# Patient Record
Sex: FEMALE | Race: WHITE
[De-identification: names, ages, dates, MRNs, and addresses within clinical notes are randomized per-mention and may not be internally consistent; named-entity substitution may affect disease eponyms.]

---

## 2017-05-30 ENCOUNTER — HOSPITAL ENCOUNTER (OUTPATIENT)
Dept: HOSPITAL 11 - JP.SDS | Age: 62
Discharge: HOME | End: 2017-05-30
Attending: SURGERY
Payer: MEDICARE

## 2017-05-30 VITALS — SYSTOLIC BLOOD PRESSURE: 123 MMHG | DIASTOLIC BLOOD PRESSURE: 65 MMHG

## 2017-05-30 DIAGNOSIS — I12.9: ICD-10-CM

## 2017-05-30 DIAGNOSIS — E11.22: ICD-10-CM

## 2017-05-30 DIAGNOSIS — Z12.11: Primary | ICD-10-CM

## 2017-05-30 DIAGNOSIS — D12.0: ICD-10-CM

## 2017-05-30 DIAGNOSIS — Z80.0: ICD-10-CM

## 2017-05-30 DIAGNOSIS — D12.3: ICD-10-CM

## 2017-05-30 DIAGNOSIS — N18.9: ICD-10-CM

## 2017-05-30 DIAGNOSIS — D12.2: ICD-10-CM

## 2017-05-30 PROCEDURE — 45380 COLONOSCOPY AND BIOPSY: CPT

## 2017-05-30 PROCEDURE — 82962 GLUCOSE BLOOD TEST: CPT

## 2017-05-30 NOTE — OR
CORRECTED COPY

 

FINDINGS:

1. Cecal polyp, 5 mm, completely removed using cold biopsy forceps.

2. Ascending colon polyp, approximately 5 mm, completely removed using cold biopsy

    forceps.

3. Transverse colon polyp, approximately 5 mm, completely removed using cold biopsy

    forceps.

4. Transverse colon polyp, #2, approximately 5 mm, completely removed using cold biopsy

    forceps.

 

 

 

DATE OF PROCEDURE:  05/30/2017

 

PROCEDURE PERFORMED:  Colonoscopy.

 

FINDINGS:

1. Cecal polyp, approximately 5 mm, completely removed using cold biopsy forceps.

2. Transverse colon polyps;.

    a.     #1, approximately 5 mm, completely removed using cold biopsy forceps.

    b.     #2, 5 mm, completely removed using cold biopsy forceps.

 

COMPLICATIONS:  None.

 

ASSISTANT:  None.

 

PREOPERATIVE DIAGNOSIS:  Family history of colorectal cancer.

 

POSTOPERATIVE DIAGNOSIS:  Family history of colorectal cancer.

 

RISKS:  Risks, benefits, alternatives, and limitations including infection, bleeding, and

perforation were explained to the patient , and they wished to proceed.

 

PROCEDURE IN DETAIL:  The patient was placed in left lateral decubitus position.  Digital

rectal exam was performed without abnormality.  The scope was introduced and advanced

atraumatically to the ileocecal valve.

 

The scope was brought back to the ascending, transverse, descending colon, and retroflexed.

 

No diverticulosis.  No masses.  The aforementioned polyps were all identified and completely

removed without abnormality.  The patient tolerated the procedure well.

 

 

 

 

Jame Higginbotham MD

DD:  05/30/2017 09:15:43

DT:  05/30/2017 09:40:12

Job #:  [id number]

## 2017-07-24 ENCOUNTER — HOSPITAL ENCOUNTER (EMERGENCY)
Dept: HOSPITAL 11 - JP.ED | Age: 62
Discharge: HOME | End: 2017-07-24
Payer: MEDICARE

## 2017-07-24 VITALS — DIASTOLIC BLOOD PRESSURE: 83 MMHG | SYSTOLIC BLOOD PRESSURE: 176 MMHG

## 2017-07-24 DIAGNOSIS — M17.0: ICD-10-CM

## 2017-07-24 DIAGNOSIS — E11.22: ICD-10-CM

## 2017-07-24 DIAGNOSIS — Z88.8: ICD-10-CM

## 2017-07-24 DIAGNOSIS — Z88.1: ICD-10-CM

## 2017-07-24 DIAGNOSIS — Z87.01: ICD-10-CM

## 2017-07-24 DIAGNOSIS — M25.562: ICD-10-CM

## 2017-07-24 DIAGNOSIS — Z79.899: ICD-10-CM

## 2017-07-24 DIAGNOSIS — Z86.2: ICD-10-CM

## 2017-07-24 DIAGNOSIS — Z88.0: ICD-10-CM

## 2017-07-24 DIAGNOSIS — I48.91: ICD-10-CM

## 2017-07-24 DIAGNOSIS — M06.9: ICD-10-CM
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
## 2017-07-24 DIAGNOSIS — Z91.041: ICD-10-CM

## 2017-07-24 DIAGNOSIS — I12.9: ICD-10-CM

## 2017-07-24 DIAGNOSIS — N18.3: ICD-10-CM

## 2017-07-24 DIAGNOSIS — Z79.4: ICD-10-CM

## 2017-07-24 DIAGNOSIS — Z91.018: ICD-10-CM

## 2017-07-24 DIAGNOSIS — S61.412A: Primary | ICD-10-CM

## 2017-07-24 DIAGNOSIS — Z98.49: ICD-10-CM

## 2017-07-24 DIAGNOSIS — Z88.2: ICD-10-CM

## 2017-07-24 DIAGNOSIS — E78.00: ICD-10-CM

## 2017-07-24 DIAGNOSIS — M25.561: ICD-10-CM

## 2017-07-24 NOTE — EDM.PDOC
ED HPI GENERAL MEDICAL PROBLEM





- General


Chief Complaint: Laceration


Stated Complaint: CUT THUMB


Time Seen by Provider: 07/24/17 20:29


Source of Information: Reports: Patient


History Limitations: Reports: No Limitations





- History of Present Illness


INITIAL COMMENTS - FREE TEXT/NARRATIVE: 





61-year-old female cut her left hand with a knife while trying to cut a steak. 

She has a curved flap laceration on the outside of the base of the thumb. No 

distal paresthesias, weakness or limited range of motion.


Onset: Today


Duration: Hour(s): (Within the last hour)


Location: Reports: Upper Extremity, Left


Associated Symptoms: Reports: No Other Symptoms





- Related Data


 Allergies











Allergy/AdvReac Type Severity Reaction Status Date / Time


 


cefprozil Allergy  Hives Verified 07/24/17 20:22


 


cefuroxime [From Ceftin] Allergy  Hives Verified 07/24/17 20:22


 


celecoxib Allergy  Hives Verified 07/24/17 20:22


 


cephalexin Allergy  Hives Verified 07/24/17 20:22


 


ciprofloxacin Allergy  Swelling Verified 07/24/17 20:22


 


clindamycin Allergy  Hives Verified 07/24/17 20:22


 


erythromycin base Allergy  Hives Verified 07/24/17 20:22


 


etanercept [From Enbrel] Allergy  Hives Verified 07/24/17 20:22


 


Iodinated Contrast- Oral and Allergy  Hives Verified 07/24/17 20:22





IV Dye     





[Iodinated Contrast Media -     





Oral and]     


 


metformin Allergy  Hives Verified 07/24/17 20:22


 


naproxen Allergy  Hives Verified 07/24/17 20:22


 


Penicillins Allergy  Hives Verified 07/24/17 20:22


 


pineapple Allergy  Other Verified 07/24/17 20:22


 


propoxyphene Allergy  Hives Verified 07/24/17 20:22


 


rofecoxib [From Vioxx] Allergy  Hives Verified 07/24/17 20:22


 


Sulfa (Sulfonamide Allergy  Hives Verified 07/24/17 20:22





Antibiotics)     











Home Meds: 


 Home Meds





Acetaminophen [Tylenol Extra Strength] 1,000 mg PO Q6H PRN 05/15/17 [History]


Acyclovir 400 mg PO TID 05/15/17 [History]


Aspirin 325 mg PO DAILY 05/15/17 [History]


Atenolol [Tenormin] 25 mg PO BEDTIME 05/15/17 [History]


Atenolol [Tenormin] 50 mg PO QAM 05/15/17 [History]


Cholecalciferol (Vitamin D3) [Vitamin D3] 3,000 units PO DAILY 05/15/17 [History

]


Folic Acid 1 mg PO DAILY 05/15/17 [History]


Furosemide [Lasix] 40 mg PO 1400 05/15/17 [History]


Furosemide [Lasix] 80 mg PO QAM 05/15/17 [History]


Hydrocodone/Acetaminophen [Hydrocodon-Acetaminophen 5-325] 1 - 2 tab PO Q6H PRN 

05/15/17 [History]


Insulin Glarg,Human.Rec.Analog [LantUS Solostar] 20 unit SQ QAM 05/15/17 [

History]


Insulin Glarg,Human.Rec.Analog [LantUS Solostar] 20 unit SUBCUT QPM 05/15/17 [

History]


Nitroglycerin [Nitrostat] 0.4 mg SL ASDIRECTED PRN 05/15/17 [History]


Omeprazole 20 mg PO BEDTIME 05/15/17 [History]


Primidone 100 mg PO BEDTIME 05/15/17 [History]


Simvastatin [Zocor] 40 mg PO BEDTIME 05/15/17 [History]


Spironolactone [Aldactone] 100 mg PO DAILY 05/15/17 [History]


azaTHIOprine [Imuran] 50 mg PO TID 05/15/17 [History]


diphenhydrAMINE [Benadryl] 25 mg PO BEDTIME PRN 05/15/17 [History]


predniSONE [Prednisone] 5 mg PO DAILY 05/15/17 [History]











Past Medical History


HEENT History: Reports: Cataract, Retinal Detachment, Other (See Below)


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


Cardiovascular History: Reports: Afib, Angina, Heart Murmur, High Cholesterol, 

Hypertension, Other (See Below)


Other Cardiovascular History: small vessel vasculitis


Respiratory History: Reports: Bronchitis, Recurrent, Pneumonia, Recurrent, 

Other (See Below)


Other Respiratory History: questionable PE in past, right lung histoplasmosis


Gastrointestinal History: Reports: Hemorrhoids


Genitourinary History: Reports: Other (See Below)


Other Genitourinary History: Stage III renal failure


OB/GYN History: Reports: Pregnancy, Other (See Below)


Other OB/BYN History: ovarian cyst


Musculoskeletal History: Reports: RA, Other (See Below)


Other Musculoskeletal History: osteopenia.  bilateral knee pain


Endocrine/Metabolic History: Reports: Diabetes, Type II, Multinodular Thyroid


Hematologic History: Reports: Anemia


Immunologic History: Reports: Other (See Below)


Other Immunologic History: Lupus


Dermatologic History: Reports: Other (See Below)


Other Dermatologic History: leg phlebitis, cellulitis





- Past Surgical History


HEENT Surgical History: Reports: Cataract Surgery, Detached Retina


GI Surgical History: Reports: Colonoscopy


Female  Surgical History: Reports: Breast Biopsy, Hysterectomy, Salpingo-

Oophorectomy, Tubal Ligation


Neurological Surgical History: Reports: Spinal Fusion, Other (See Below)


Other Neurological Surgeries/Procedures: L5-S1 fusion


Musculoskeletal Surgical History: Reports: Arthroscopic Procedure, Other (See 

Below)


Other Musculoskeletal Surgeries/Procedures:: "Huey" feet - reconstruction of 

bilat feet





Social & Family History





- Tobacco Use


Smoking Status *Q: Never Smoker


Second Hand Smoke Exposure: Yes





- Caffeine Use


Caffeine Use: Reports: Soda





- Recreational Drug Use


Recreational Drug Use: No





ED ROS GENERAL





- Review of Systems


Review Of Systems: See Below


Constitutional: Denies: Fever


Respiratory: Denies: Shortness of Breath


GI/Abdominal: Denies: Nausea, Vomiting


Skin: Reports: Other (Patient does have lupus, "weak skin".)





ED EXAM, SKIN/RASH


Exam: See Below


Exam Limited By: No Limitations


General Appearance: Alert, No Apparent Distress


Respiratory/Chest: No Respiratory Distress


Extremities: Other (Exam is otherwise limited to the left hand. The patient is 

a 2.5 cm curved flap laceration on the back of the left thumb over the 

metacarpal area. Distal CMS is normal.)





Course





- Vital Signs


Last Recorded V/S: 


 Last Vital Signs











Temp  98.0 F   07/24/17 20:28


 


Pulse  66   07/24/17 20:28


 


Resp  16   07/24/17 20:28


 


BP  176/83 H  07/24/17 20:28


 


Pulse Ox  97   07/24/17 20:28














- Orders/Labs/Meds


Meds: 


Medications














Discontinued Medications














Generic Name Dose Route Start Last Admin





  Trade Name Freq  PRN Reason Stop Dose Admin


 


Bacitracin  1 dose  07/24/17 20:25  07/24/17 21:06





  Bacitracin Oint 1 Gm  TOP  07/24/17 20:26  1 dose





  ONETIME ONE   Administration


 


Lidocaine HCl  5 ml  07/24/17 20:25  07/24/17 21:06





  Xylocaine-Mpf 1%  INJECT  07/24/17 20:26  5 ml





  ONETIME ONE   Administration














- Re-Assessments/Exams


Free Text/Narrative Re-Assessment/Exam: 





07/24/17 21:03


The wound was anesthetized with 1% lidocaine, cleansed thoroughly with saline 

and Hibiclens and 5 5-0 Ethilon sutures were used to close the wound. Topical 

bacitracin was applied and the sutures can be removed in 8 days.





Departure





- Departure


Time of Disposition: 21:35


Disposition: Home, Self-Care 01


Condition: Good


Clinical Impression: 


Laceration of hand


Qualifiers:


 Encounter type: initial encounter Foreign body presence: without foreign body 

Laterality: left Qualified Code(s): S61.412A - Laceration without foreign body 

of left hand, initial encounter








- Discharge Information


Instructions:  Laceration Care, Adult, Easy-to-Read


Referrals: 


Baldemar Bee MD [Primary Care Provider] - 


Forms:  ED Department Discharge


Care Plan Goals: 


Keep wound covered and clean while healing. Sutures can be removed in 8 days, 

recheck sooner if concerns of infection or not healing satisfactorily.

## 2018-07-17 ENCOUNTER — HOSPITAL ENCOUNTER (OUTPATIENT)
Dept: HOSPITAL 11 - JP.SDS | Age: 63
Discharge: HOME | End: 2018-07-17
Attending: SURGERY
Payer: MEDICARE

## 2018-07-17 VITALS — DIASTOLIC BLOOD PRESSURE: 66 MMHG | SYSTOLIC BLOOD PRESSURE: 142 MMHG

## 2018-07-17 DIAGNOSIS — Z12.11: Primary | ICD-10-CM

## 2018-07-17 DIAGNOSIS — N18.9: ICD-10-CM

## 2018-07-17 DIAGNOSIS — Z80.0: ICD-10-CM

## 2018-07-17 DIAGNOSIS — I12.9: ICD-10-CM

## 2018-07-17 DIAGNOSIS — Z86.010: ICD-10-CM

## 2018-07-17 DIAGNOSIS — D12.4: ICD-10-CM

## 2018-07-17 DIAGNOSIS — E11.22: ICD-10-CM

## 2018-07-17 DIAGNOSIS — K64.4: ICD-10-CM

## 2018-07-17 PROCEDURE — 45380 COLONOSCOPY AND BIOPSY: CPT

## 2018-07-17 NOTE — OR
DATE OF PROCEDURE:  07/17/2018

 

PROCEDURE:  Colonoscopy.

 

FINDINGS:  Descending colon polyp, approximately 5 mm, completely removed using cold biopsy

forceps.

 

COMPLICATIONS:  None.

 

ASSISTANT:  None.

 

ANESTHETIC:  MAC.

 

PREOPERATIVE DIAGNOSIS:  History of colon polyps/family history of colorectal cancer.

 

POSTOPERATIVE DIAGNOSIS:  History of colon polyps/family history of colorectal cancer.

 

RISKS:  Risks, benefits, alternatives, and limitations including, but not limited to

infection, bleeding, and perforation were explained to the patient, who wished to proceed.

 

PROCEDURE DETAILS:  The patient was placed in left lateral decubitus position.  Digital

rectal exam was performed, which showed mild external hemorrhoids.  The scope was introduced

and advanced atraumatically to the cecum.  A photo was taken of this.  The scope was brought

back to the remaining colon.  The aforementioned polyp was identified and completely

removed.  No other abnormalities.  No old or new blood.  The patient tolerated the procedure

well.

 

No diverticulosis or other polyps.

 

 

 

 

Jame Higginbotham MD

DD:  07/17/2018 08:38:27

DT:  07/17/2018 08:51:07

Job #:  [id number]

## 2021-05-20 NOTE — EDM.PDOC
ED HPI GENERAL MEDICAL PROBLEM





- General


Chief Complaint: General


Stated Complaint: BRUISING ON RT SIDE AFTER PROCEDURE


Time Seen by Provider: 05/20/21 18:42


Source of Information: Reports: Patient


History Limitations: Reports: No Limitations





- History of Present Illness


INITIAL COMMENTS - FREE TEXT/NARRATIVE: 


Rani is a 65-year-old female presenting to the ED for evaluation of a large 

hematoma in her right forearm, arm, and extending into the axilla and right 

chest/breast area.  The patient underwent a cardiac angiogram at [facility] days ago where they did a left radial artery approach.  They had to 

downsize the size of the catheter because of vasospasm resulting in a large 

extravasation of blood around the artery extending up the arm.  She had a 

significant drop in her hemoglobin from 11.3-9.0 resulting in them keeping her 

overnight for observation.  She states that they gave her some type of sedative 

for the angiogram and when she woke up her throat felt quite scratchy like she 

had been intubated.  Since then she has been having difficulty with 

concentration and feels ill.  No recollection of the angiogram due to the 

sedation.  She is worried because the arm and right breast are becoming quite 

tender and painful.  She reports that during her stay in the hospital they were 

having difficulty keeping her sugars under control because they were underdosing

her with insulin resulting in her leukosis levels being over 200.  At one point 

they were over 350 and the only gave her 5 units of regular insulin for this.  

He also has a history of stage III kidney failure and was supposed to have a 

creatinine drawn in the clinic today but did not make it because she was not 

feeling well.  She also reports that she was hypokalemic at the time of dischar

ge with a potassium of 3.2 and they gave her a couple tablets of K-Linda but did 

not check her again after that.  She is on Plavix following the angioplasty.  

She has history significant for a loud systolic ejection murmur heard in the 

left upper sternal border, bilateral carotid stenosis with the right carotid 

being 100% occluded and left carotid being 69% occluded.  She denies any new 

numbness, weakness, or incoordination.





  ** Right Arm


Pain Score (Numeric/FACES): 6





- Related Data


                                    Allergies











Allergy/AdvReac Type Severity Reaction Status Date / Time


 


cefprozil Allergy  Hives Verified 05/20/21 18:44


 


cefuroxime [From Ceftin] Allergy  Hives Verified 05/20/21 18:44


 


celecoxib Allergy  Hives Verified 05/20/21 18:44


 


cephalexin Allergy  Hives Verified 05/20/21 18:44


 


ciprofloxacin Allergy  Swelling Verified 05/20/21 18:44


 


clindamycin Allergy  Hives Verified 05/20/21 18:44


 


erythromycin base Allergy  Hives Verified 05/20/21 18:44


 


etanercept [From Enbrel] Allergy  Hives Verified 05/20/21 18:44


 


Iodinated Contrast Media Allergy  Hives Verified 05/20/21 18:44





[Iodinated Contrast Media -     





Oral and]     


 


metformin Allergy  Hives Verified 05/20/21 18:44


 


naproxen Allergy  Hives Verified 05/20/21 18:44


 


Penicillins Allergy  Hives Verified 05/20/21 18:44


 


pineapple Allergy  Other Verified 05/20/21 18:44


 


propoxyphene Allergy  Hives Verified 05/20/21 18:44


 


rofecoxib [From Vioxx] Allergy  Hives Verified 05/20/21 18:44


 


Sulfa (Sulfonamide Allergy  Hives Verified 05/20/21 18:44





Antibiotics)     











Home Meds: 


                                    Home Meds





Acetaminophen [Tylenol Extra Strength] 1,000 mg PO Q6H PRN 05/15/17 [History]


Aspirin 81 mg PO DAILY 05/15/17 [History]


Cholecalciferol (Vitamin D3) [Vitamin D3] 1,000 units PO DAILY 05/15/17 

[History]


Folic Acid 1 mg PO DAILY 05/15/17 [History]


Furosemide [Lasix] 80 mg PO DAILY 05/15/17 [History]


Hydrocodone/Acetaminophen [Hydrocodon-Acetaminophen 5-325] 1 - 2 tab PO Q6H PRN 

05/15/17 [History]


Insulin Glarg,Human.Rec.Analog [LantUS Solostar] 25 unit SQ QAM 05/15/17 

[History]


Omeprazole 20 mg PO BEDTIME 05/15/17 [History]


Primidone 100 mg PO BEDTIME 05/15/17 [History]


Spironolactone [Aldactone] 100 mg PO DAILY 05/15/17 [History]


azaTHIOprine [Imuran] 50 mg PO DAILY 05/15/17 [History]


predniSONE [Prednisone] 5 mg PO DAILY 05/15/17 [History]


Propranolol [Inderal] 20 mg PO BID 07/16/18 [History]


Acyclovir [Zovirax] 200 mg PO TID 03/10/20 [History]


Doxycycline [Vibramycin] 1 cap PO BID 03/10/20 [History]


Ibandronate [Boniva] 1 tab PO ASDIRECTED 03/10/20 [History]


Insulin Aspart [NovoLOG] 0 units SQ ASDIRECTED 03/10/20 [History]


Clopidogrel [Plavix] 75 mg PO DAILY 05/20/21 [History]


Furosemide 40 mg PO BEDTIME 05/20/21 [History]


Rosuvastatin [Crestor] 40 mg PO BEDTIME 05/20/21 [History]


amLODIPine [Norvasc] 2.5 mg PO DAILY 05/20/21 [History]











Past Medical History


HEENT History: Reports: Cataract, Retinal Detachment, Other (See Below)


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


Cardiovascular History: Reports: Afib, Angina, Heart Murmur, High Cholesterol, 

Hypertension, Other (See Below)


Other Cardiovascular History: small vessel vasculitis.  angiogram with stent 

april and may 2021.


Respiratory History: Reports: Bronchitis, Recurrent, Pneumonia, Recurrent, Other

 (See Below)


Other Respiratory History: questionable PE in past, right lung histoplasmosis


Gastrointestinal History: Reports: Colon Polyp, Hemorrhoids


Genitourinary History: Reports: Renal Calculus, Other (See Below)


Other Genitourinary History: Stage III renal failure


OB/GYN History: Reports: Dysfunctional Uterine Bleeding, Pregnancy, Other (See 

Below)


Other OB/GYN History: ovarian cyst


Musculoskeletal History: Reports: Back Pain, Chronic, Fracture, RA, SLE, Other 

(See Below)


Other Musculoskeletal History: osteopenia.  bilateral knee pain


Neurological History: Reports: None


Endocrine/Metabolic History: Reports: Diabetes, Type II, Multinodular Thyroid


Hematologic History: Reports: Anemia, Blood Transfusion(s)


Immunologic History: Reports: Other (See Below)


Other Immunologic History: Lupus


Dermatologic History: Reports: Other (See Below)


Other Dermatologic History: leg phlebitis, cellulitis





- Infectious Disease History


Infectious Disease History: Reports: Chicken Pox, Measles, Pertussis (Whooping 

Cough), Rheumatic Fever, Scarlet Fever, Shingles





- Past Surgical History


HEENT Surgical History: Reports: Cataract Surgery, Detached Retina


Cardiovascular Surgical History: Reports: Coronary Artery Stent


GI Surgical History: Reports: Colonoscopy, EGD


Female  Surgical History: Reports: Breast Biopsy, Hysterectomy, Salpingo-

Oophorectomy, Tubal Ligation


Endocrine Surgical History: Reports: Thyroid Biopsy


Neurological Surgical History: Reports: Spinal Fusion, Other (See Below)


Other Neurological Surgeries/Procedures: L5-S1 fusion


Musculoskeletal Surgical History: Reports: Arthroscopic Procedure, Other (See 

Below)


Other Musculoskeletal Surgeries/Procedures:: Scharco" feet - reconstruction of 

bilat feet





Social & Family History





- Tobacco Use


Tobacco Use Status *Q: Never Tobacco User





- Caffeine Use


Caffeine Use: Reports: None





- Recreational Drug Use


Recreational Drug Use: No





ED ROS GENERAL





- Review of Systems


Review Of Systems: See Below


Constitutional: Reports: No Symptoms


HEENT: Reports: Throat Pain (Patient states this feels like they had intubated 

her with her throat feeling scratchy)


Respiratory: Reports: No Symptoms


Cardiovascular: Reports: No Symptoms


Endocrine: Reports: No Symptoms


GI/Abdominal: Reports: No Symptoms


: Reports: No Symptoms


Musculoskeletal: Reports: Arm Pain (Right arm pain secondary to hematoma 

formation in the right forearm, arm, axilla, and chest wall)


Skin: Reports: Bruising (Extensive bruising through the right forearm, arm, and 

axilla)


Neurological: Reports: Other (Patient is complaining of feeling "foggy" since 

undergoing her procedure)


Psychiatric: Reports: No Symptoms


Hematologic/Lymphatic: Reports: No Symptoms


Immunologic: Reports: No Symptoms





ED EXAM, GENERAL





- Physical Exam


Exam: See Below


Exam Limited By: No Limitations


General Appearance: Alert, No Apparent Distress, Anxious


Eye Exam: Bilateral Eye: EOMI, PERRL


Throat/Mouth: Normal Inspection


Head: Atraumatic, Normocephalic


Neck: Normal Inspection, Supple, Carotid Bruit (Left-sided)


Respiratory/Chest: No Respiratory Distress, Lungs Clear, Normal Breath Sounds


Cardiovascular: Normal Peripheral Pulses, Regular Rate, Rhythm, No Edema, No 

JVD, Systolic Murmur (Grade 4/6 systolic ejection murmur heard in the left upper

 sternal region.)


Peripheral Pulses: 2+: Radial (L), Radial (R), Posterior Tibial (L), Posterior 

Tibial (R)


GI/Abdominal: Normal Bowel Sounds, Soft, Non-Tender


Back Exam: Normal Inspection


Extremities: Normal Range of Motion, No Pedal Edema, Normal Capillary Refill, 

Other (Very large hematoma extending up to the right dorsal forearm and arm into

 the axilla and lateral chest wall the area is very tender to palpation.)


Neurological: Alert, Oriented, Normal Cognition, No Motor/Sensory Deficits


Psychiatric: Normal Affect


Skin Exam: Warm, Dry, Ecchymosis (Large area of ecchymosis tracking down the 

dorsal forearm, arm, into the axilla and chest wall), Increased Warmth 

(Following the path of the ecchymosis)


Lymphatic: No Adenopathy





Course





- Vital Signs


Last Recorded V/S: 


                                Last Vital Signs











Temp  36.4 C   05/20/21 18:43


 


Pulse  72   05/20/21 20:56


 


Resp  16   05/20/21 18:43


 


BP  127/63   05/20/21 20:56


 


Pulse Ox  99   05/20/21 20:56














- Orders/Labs/Meds


Labs: 


                                Laboratory Tests











  05/20/21 05/20/21 05/20/21 Range/Units





  19:10 19:10 19:10 


 


WBC  7.8    (4.5-11.0)  K/uL


 


RBC  2.95 L    (3.30-5.50)  M/uL


 


Hgb  9.4 L D    (12.0-15.0)  g/dL


 


Hct  29.3 L    (36.0-48.0)  %


 


MCV  99 H    (80-98)  fL


 


MCH  32 H    (27-31)  pg


 


MCHC  32    (32-36)  %


 


Plt Count  198    (150-400)  K/uL


 


Neut % (Auto)  70.0 H    (36-66)  %


 


Lymph % (Auto)  19.1 L    (24-44)  %


 


Mono % (Auto)  9.4 H    (2-6)  %


 


Eos % (Auto)  1.4 L    (2-4)  %


 


Baso % (Auto)  0.1    (0-1)  %


 


PT   10.4   (9.5-12.0)  sec


 


INR   0.95   (0.80-1.20)  


 


APTT   21.3 L   (27.0-36.0)  sec


 


Sodium    140  (140-148)  mmol/L


 


Potassium    3.9  (3.6-5.2)  mmol/L


 


Chloride    97 L  (100-108)  mmol/L


 


Carbon Dioxide    33 H  (21-32)  mmol/L


 


Anion Gap    13.9  (5.0-14.0)  mmol/L


 


BUN    30 H  (7-18)  mg/dL


 


Creatinine    1.4 H  (0.6-1.0)  mg/dL


 


Est Cr Clr Drug Dosing    39.33  mL/min


 


Estimated GFR (MDRD)    38 L  (>60)  


 


Glucose    136 H  ()  mg/dL


 


Calcium    9.1  (8.5-10.1)  mg/dL


 


Total Bilirubin    0.4  (0.2-1.0)  mg/dL


 


AST    19  (15-37)  U/L


 


ALT    23  (12-78)  U/L


 


Alkaline Phosphatase    74  ()  U/L


 


Total Protein    6.8  (6.4-8.2)  g/dL


 


Albumin    3.7  (3.4-5.0)  g/dL


 


Globulin    3.1  (2.3-3.5)  g/dL


 


Albumin/Globulin Ratio    1.2  (1.2-2.2)  


 


Urine Color     (YELLOW)  


 


Urine Appearance     (CLEAR)  


 


Urine pH     (5.0-8.0)  


 


Ur Specific Gravity     (1.008-1.030)  


 


Urine Protein     (NEGATIVE)  mg/dL


 


Urine Glucose (UA)     (NEGATIVE)  mg/dL


 


Urine Ketones     (NEGATIVE)  mg/dL


 


Urine Occult Blood     (NEGATIVE)  


 


Urine Nitrite     (NEGATIVE)  


 


Urine Bilirubin     (NEGATIVE)  


 


Urine Urobilinogen     (0.2-1.0)  EU/dL


 


Ur Leukocyte Esterase     (NEGATIVE)  


 


Urine RBC     (0-5)  


 


Urine WBC     (0-5)  


 


Ur Epithelial Cells     


 


Amorphous Sediment     


 


Urine Bacteria     


 


Urine Mucus     














  05/20/21 Range/Units





  19:46 


 


WBC   (4.5-11.0)  K/uL


 


RBC   (3.30-5.50)  M/uL


 


Hgb   (12.0-15.0)  g/dL


 


Hct   (36.0-48.0)  %


 


MCV   (80-98)  fL


 


MCH   (27-31)  pg


 


MCHC   (32-36)  %


 


Plt Count   (150-400)  K/uL


 


Neut % (Auto)   (36-66)  %


 


Lymph % (Auto)   (24-44)  %


 


Mono % (Auto)   (2-6)  %


 


Eos % (Auto)   (2-4)  %


 


Baso % (Auto)   (0-1)  %


 


PT   (9.5-12.0)  sec


 


INR   (0.80-1.20)  


 


APTT   (27.0-36.0)  sec


 


Sodium   (140-148)  mmol/L


 


Potassium   (3.6-5.2)  mmol/L


 


Chloride   (100-108)  mmol/L


 


Carbon Dioxide   (21-32)  mmol/L


 


Anion Gap   (5.0-14.0)  mmol/L


 


BUN   (7-18)  mg/dL


 


Creatinine   (0.6-1.0)  mg/dL


 


Est Cr Clr Drug Dosing   mL/min


 


Estimated GFR (MDRD)   (>60)  


 


Glucose   ()  mg/dL


 


Calcium   (8.5-10.1)  mg/dL


 


Total Bilirubin   (0.2-1.0)  mg/dL


 


AST   (15-37)  U/L


 


ALT   (12-78)  U/L


 


Alkaline Phosphatase   ()  U/L


 


Total Protein   (6.4-8.2)  g/dL


 


Albumin   (3.4-5.0)  g/dL


 


Globulin   (2.3-3.5)  g/dL


 


Albumin/Globulin Ratio   (1.2-2.2)  


 


Urine Color  Yellow  (YELLOW)  


 


Urine Appearance  Clear  (CLEAR)  


 


Urine pH  7.0  (5.0-8.0)  


 


Ur Specific Gravity  1.015  (1.008-1.030)  


 


Urine Protein  30 H  (NEGATIVE)  mg/dL


 


Urine Glucose (UA)  Negative  (NEGATIVE)  mg/dL


 


Urine Ketones  Negative  (NEGATIVE)  mg/dL


 


Urine Occult Blood  Trace-intact H  (NEGATIVE)  


 


Urine Nitrite  Negative  (NEGATIVE)  


 


Urine Bilirubin  Negative  (NEGATIVE)  


 


Urine Urobilinogen  1.0  (0.2-1.0)  EU/dL


 


Ur Leukocyte Esterase  Trace H  (NEGATIVE)  


 


Urine RBC  0-5  (0-5)  


 


Urine WBC  5-10 H  (0-5)  


 


Ur Epithelial Cells  Few  


 


Amorphous Sediment  Not seen  


 


Urine Bacteria  Moderate  


 


Urine Mucus  Few  














- Re-Assessments/Exams


Free Text/Narrative Re-Assessment/Exam: 





05/20/21 21:38 I reviewed the patient's labs showing her hemoglobin is at 9.4 

which is consistent with where it was when she was discharged from the hospital 

following the angiogram.  Her kidney function is doing well with a creatinine of

 1.4.  Her GFR is calculated at 45 which puts her at a chronic kidney disease 

stage IIIa.  Her potassium is normal at 3.9.  At this time, the brain fog is 

likely due to recovering from her hypnotic that was used during her sedation for

 her angiogram.  This may take up to a week to clear.  I encouraged her to 

continue to push fluids to help flush the kidneys from the contrast use during 

the angiogram and angioplasty.  At this time she is stable and suitable for 

discharge home in satisfactory condition.  She may continue to take Tylenol for 

pain control and I encouraged her to use heating pads or heating blanket on the 

area of the hematoma to help with absorption.  All questions were answered prior

 to discharge and indications return to the ED were discussed.








Departure





- Departure


Time of Disposition: 21:35


Disposition: Home, Self-Care 01


Clinical Impression: 


 Brain fog





Postoperative hematoma involving circulatory system


Qualifiers:


 Procedure type: cardiac catheterization Qualified Code(s): I97.630 - 

Postprocedural hematoma of a circulatory system organ or structure following a 

cardiac catheterization





Chronic renal failure, stage 3 (moderate)


Qualifiers:


 Chronic kidney disease stage 3 subtype: stage 3a (GFR 45-59) Qualified Code(s):

 N18.31 - Chronic kidney disease, stage 3a








- Discharge Information


Instructions:  Chronic Kidney Disease, Adult, Easy-to-Read


Referrals: 


Baldemar Bee MD [Primary Care Provider] - 


Forms:  ED Department Discharge


Care Plan Goals: 


The hematoma in your forearm, arm, and chest wall are likely stable at this 

point.  Will take several weeks for this to reabsorb.  You may use heating pad 

or hot packs to the area to help reduce pain and swelling.  Please take Tylenol 

also for the pain.  You will want to avoid ibuprofen as you are already on P

lavix.  Continue to push fluids to prevent dehydration and keep the kidneys 

functioning properly after the contrast from the angiogram.





Sepsis Event Note (ED)





- Evaluation


Sepsis Screening Result: No Definite Risk





- Focused Exam


Vital Signs: 


                                   Vital Signs











  Temp Pulse Resp BP Pulse Ox


 


 05/20/21 20:56   72   127/63  99


 


 05/20/21 18:43  36.4 C  81  16  147/64 H  97


 


 05/20/21 18:20  36.4 C  81  16  147/64 H  97














- Problem List & Annotations


(1) Brain fog


SNOMED Code(s): 401663351


   Code(s): F48.8 - OTHER SPECIFIED NONPSYCHOTIC MENTAL DISORDERS   Status: 

Acute   Priority: Medium   Current Visit: Yes   





(2) Chronic renal failure, stage 3 (moderate)


SNOMED Code(s): 97410850, 383749848


   Code(s): N18.30 - CHRONIC KIDNEY DISEASE, STAGE 3 UNSPECIFIED   Status: 

Chronic   Priority: Medium   Current Visit: Yes   


Qualifiers: 


   Chronic kidney disease stage 3 subtype: stage 3a (GFR 45-59)   Qualified 

Code(s): N18.31 - Chronic kidney disease, stage 3a   





(3) Postoperative hematoma involving circulatory system


SNOMED Code(s): 192273326, 704912916


   Code(s): SIP9533 -    Status: Acute   Priority: High   Current Visit: Yes   


Qualifiers: 


   Procedure type: cardiac catheterization   Qualified Code(s): I97.630 - 

Postprocedural hematoma of a circulatory system organ or structure following a 

cardiac catheterization   





- Problem List Review


Problem List Initiated/Reviewed/Updated: Yes

## 2021-10-25 NOTE — EDM.PDOC
ED HPI GENERAL MEDICAL PROBLEM





- General


Chief Complaint: Chest Pain


Stated Complaint: BURNING IN CHEST


Time Seen by Provider: 10/25/21 12:35


Source of Information: Reports: Patient


History Limitations: Reports: No Limitations





- History of Present Illness


INITIAL COMMENTS - FREE TEXT/NARRATIVE: 





66-year-old female who has known coronary artery disease, had stents placed 

earlier this year and went in for a routine post evaluation at the clinic for 

cardiology follow-up and informed the provider that she was having recurring 

burning in her chest.  It sounds like it was fairly stable initially but she 

starting to have symptoms at rest.  An EKG apparently was done at the clinic 

which showed some changes that were stable from previous EKGs.  The patient says

she occasionally takes nitroglycerin but it gives her headache so she typically 

just rests until the pressure or burning goes away.  I got a call from a clinic 

nurse that said she was coming over to be evaluated for "unstable angina".  I 

did not get a call from the provider.  She has no nausea or vomiting.  The 

symptoms have been ongoing for several weeks but seem to be worsening.


Onset: Gradual


Duration: Week(s): (Several weeks)


Location: Reports: Chest (Chest burning)


Associated Symptoms: Reports: No Other Symptoms


  ** Chest


Pain Score (Numeric/FACES): 4





- Related Data


                                    Allergies











Allergy/AdvReac Type Severity Reaction Status Date / Time


 


cefprozil Allergy  Hives Verified 10/25/21 12:50


 


cefuroxime [From Ceftin] Allergy  Hives Verified 10/25/21 12:50


 


celecoxib Allergy  Hives Verified 10/25/21 12:50


 


cephalexin Allergy  Hives Verified 10/25/21 12:50


 


ciprofloxacin Allergy  Swelling Verified 10/25/21 12:50


 


clindamycin Allergy  Hives Verified 10/25/21 12:50


 


erythromycin base Allergy  Hives Verified 10/25/21 12:50


 


etanercept [From Enbrel] Allergy  Hives Verified 10/25/21 12:50


 


Iodinated Contrast Media Allergy  Hives Verified 10/25/21 12:50





[Iodinated Contrast Media -     





Oral and]     


 


metformin Allergy  Hives Verified 10/25/21 12:50


 


naproxen Allergy  Hives Verified 10/25/21 12:50


 


Penicillins Allergy  Hives Verified 10/25/21 12:50


 


pineapple Allergy  Other Verified 10/25/21 12:50


 


propoxyphene Allergy  Hives Verified 10/25/21 12:50


 


rofecoxib [From Vioxx] Allergy  Hives Verified 10/25/21 12:50


 


Sulfa (Sulfonamide Allergy  Hives Verified 10/25/21 12:50





Antibiotics)     











Home Meds: 


                                    Home Meds





Acetaminophen [Tylenol Extra Strength] 1,000 mg PO Q6H PRN 05/15/17 [History]


Aspirin 81 mg PO DAILY 05/15/17 [History]


Cholecalciferol (Vitamin D3) [Vitamin D3] 1,000 units PO DAILY 05/15/17 

[History]


Folic Acid 1 mg PO DAILY 05/15/17 [History]


Furosemide [Lasix] 80 mg PO DAILY 05/15/17 [History]


Hydrocodone/Acetaminophen [Hydrocodon-Acetaminophen 5-325] 1 - 2 tab PO Q6H PRN 

05/15/17 [History]


Insulin Glarg,Human.Rec.Analog [LantUS Solostar] 25 unit SQ QAM 05/15/17 

[History]


Omeprazole 20 mg PO BEDTIME 05/15/17 [History]


Primidone 100 mg PO BEDTIME 05/15/17 [History]


Spironolactone [Aldactone] 100 mg PO DAILY 05/15/17 [History]


azaTHIOprine [Imuran] 50 mg PO DAILY 05/15/17 [History]


predniSONE [Prednisone] 5 mg PO DAILY 05/15/17 [History]


Propranolol [Inderal] 20 mg PO BID 07/16/18 [History]


Acyclovir [Zovirax] 200 mg PO TID 03/10/20 [History]


Doxycycline [Vibramycin] 1 cap PO BID 03/10/20 [History]


Ibandronate [Boniva] 1 tab PO ASDIRECTED 03/10/20 [History]


Insulin Aspart [NovoLOG] 0 units SQ ASDIRECTED 03/10/20 [History]


Clopidogrel [Plavix] 75 mg PO DAILY 05/20/21 [History]


Furosemide 40 mg PO BEDTIME 05/20/21 [History]


Rosuvastatin [Crestor] 40 mg PO BEDTIME 05/20/21 [History]


amLODIPine [Norvasc] 2.5 mg PO DAILY 05/20/21 [History]











Past Medical History


HEENT History: Reports: Cataract, Retinal Detachment, Other (See Below)


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


Cardiovascular History: Reports: Afib, Angina, Heart Murmur, High Cholesterol, 

Hypertension, Other (See Below)


Other Cardiovascular History: small vessel vasculitis.  angiogram with stent 

april and may 2021.


Respiratory History: Reports: Bronchitis, Recurrent, Pneumonia, Recurrent, Other

 (See Below)


Other Respiratory History: questionable PE in past, right lung histoplasmosis


Gastrointestinal History: Reports: Colon Polyp, Hemorrhoids


Genitourinary History: Reports: Renal Calculus, Other (See Below)


Other Genitourinary History: Stage III renal failure


OB/GYN History: Reports: Dysfunctional Uterine Bleeding, Pregnancy, Other (See 

Below)


Other OB/GYN History: ovarian cyst


Musculoskeletal History: Reports: Back Pain, Chronic, Fracture, RA, SLE, Other 

(See Below)


Other Musculoskeletal History: osteopenia.  bilateral knee pain


Neurological History: Reports: None


Endocrine/Metabolic History: Reports: Diabetes, Type II, Multinodular Thyroid


Hematologic History: Reports: Anemia, Blood Transfusion(s)


Immunologic History: Reports: Other (See Below)


Other Immunologic History: Lupus


Dermatologic History: Reports: Other (See Below)


Other Dermatologic History: leg phlebitis, cellulitis





- Infectious Disease History


Infectious Disease History: Reports: Chicken Pox, Measles, Pertussis (Whooping 

Cough), Rheumatic Fever, Scarlet Fever, Shingles





- Past Surgical History


HEENT Surgical History: Reports: Cataract Surgery, Detached Retina


Cardiovascular Surgical History: Reports: Coronary Artery Stent


GI Surgical History: Reports: Colonoscopy, EGD


Female  Surgical History: Reports: Breast Biopsy, Hysterectomy, Salpingo-

Oophorectomy, Tubal Ligation


Endocrine Surgical History: Reports: Thyroid Biopsy


Neurological Surgical History: Reports: Spinal Fusion, Other (See Below)


Other Neurological Surgeries/Procedures: L5-S1 fusion


Musculoskeletal Surgical History: Reports: Arthroscopic Procedure, Other (See 

Below)


Other Musculoskeletal Surgeries/Procedures:: Scharco" feet - reconstruction of 

bilat feet





Social & Family History





- Caffeine Use


Caffeine Use: Reports: None





ED ROS GENERAL





- Review of Systems


Review Of Systems: See Below


Constitutional: Denies: Fever, Chills


Respiratory: Denies: Shortness of Breath


Cardiovascular: Reports: Chest Pain (Burning sensation)


GI/Abdominal: Denies: Abdominal Pain, Nausea, Vomiting


Skin: Denies: Diaphoresis


Neurological: Reports: Other (Patient struggles with some type of chronic 

peripheral neuropathy)


Psychiatric: Reports: No Symptoms





ED EXAM, GENERAL





- Physical Exam


Exam: See Below


Exam Limited By: No Limitations


General Appearance: Alert, No Apparent Distress


Head: Atraumatic


Respiratory/Chest: No Respiratory Distress, Lungs Clear, Other (I cannot 

reproduce chest wall tenderness to palpation)


Cardiovascular: Regular Rate, Rhythm


GI/Abdominal: Soft, Non-Tender


Extremities: No: Pedal Edema


Neurological: Alert, Oriented


Psychiatric: Normal Affect, Normal Mood


Skin Exam: Warm, Dry





Course





- Vital Signs


Last Recorded V/S: 


                                Last Vital Signs











Temp  97.3 F   10/25/21 12:49


 


Pulse  70   10/25/21 12:49


 


Resp  16   10/25/21 12:49


 


BP  159/56 H  10/25/21 12:49


 


Pulse Ox  98   10/25/21 12:49














- Orders/Labs/Meds


Labs: 


                                Laboratory Tests











  10/25/21 Range/Units





  12:48 


 


Troponin I  < 0.017  (0.000-0.056)  ng/mL














- Re-Assessments/Exams


Free Text/Narrative Re-Assessment/Exam: 





10/25/21 12:48


I reviewed the clinic note from the cardiologist nurse practitioner.  Apparently

 she was sent over for troponin, if it was positive more urgent evaluation was 

needed, if negative an outpatient angiogram will be set up.  A troponin was 

ordered and I will pass along the result to the nurse practitioner when it is 

available.


10/25/21 13:37


Troponin is negative and the patient remained stable while in the emergency 

room.  She will be discharged to follow-up with cardiology to arrange her 

evaluation at another time.  She can return anytime if worsening or her pain is 

more persistent.





Departure





- Departure


Time of Disposition: 13:45


Disposition: Home, Self-Care 01


Clinical Impression: 


 Angina at rest








- Discharge Information


Instructions:  Angina, Easy-to-Read


Referrals: 


Baldemar Bee MD [Primary Care Provider] - 


Forms:  ED Department Discharge


Care Plan Goals: 


Recheck with your cardiology provider to discuss further evaluation for your 

developing chest pains.  Return to the emergency room at any time if pains are 

persistent, involve shortness of breath or other concerning symptoms.





Sepsis Event Note (ED)





- Focused Exam


Vital Signs: 


                                   Vital Signs











  Temp Pulse Resp BP Pulse Ox


 


 10/25/21 12:49  97.3 F  70  16  159/56 H  98


 


 10/25/21 12:27  97.3 F  70  16  159/56 H  98